# Patient Record
(demographics unavailable — no encounter records)

---

## 2025-05-05 NOTE — PHYSICAL EXAM
[NI] : Normal [de-identified] : l lower eyelid skin with red biopsy site  along the lateral central 1/3 of the eyelid near the tear trough  [de-identified] : lesion as above l lower eylid

## 2025-05-05 NOTE — ASSESSMENT
[FreeTextEntry1] : ALEXANDER desires to have excision and repair done with frozen section at the hospital rather than going to a moh's surgeon and then having reconstruction elsewhere The risks, benefits, alternatives, limitations and the permanent scars were outlined with the patient. Discussion of risks included but not limited to bleeding, infection, delayed healing, and anesthetic risk. All of ALEXANDER 's questions and concerns were addressed and answered completely  The instructions were reviewed in detail with ALEXANDER.

## 2025-05-05 NOTE — HISTORY OF PRESENT ILLNESS
[FreeTextEntry1] : ALEXANDER is 75 y/o wf c/o new bcca l lower eyelid after biopsy of new lesion by dermatologist  ALEXANDER was referred for Mohs but desires to have plastic surgeon excise and repair defect in the hospital The risks, benefits, alternatives, limitations and the permanent scars were outlined with the patient. Discussion of risks included but not limited to bleeding, infection, delayed healing, and anesthetic risk. All of ALEXANDER 's questions and concerns were addressed and answered completely  The instructions were reviewed in detail with ALEXANDER.

## 2025-07-01 NOTE — ASSESSMENT
[FreeTextEntry1] : excellent post operative result and pt is happy with appearance The instructions were reviewed in detail with ALEXANDER.  All of ALEXANDER 's questions and concerns were addressed and answered completely ALEXANDER will return to the office for a post procedure visit 5 weeks for check.  ALEXANDER instructed to limit activity next 3 weeks

## 2025-07-01 NOTE — HISTORY OF PRESENT ILLNESS
[FreeTextEntry1] : ALEXANDER is doing well after excision bcca l lower eyelid with rotation advancement flap reconstruction The patient denies fever, chills, shortness of breath, chest pain, calf pain ALEXANDER  has had uncomplicated recovery from surgery and anesthesia All of ALEXANDER's incisions are clean dry and healing well.  Her  sutures were removed and areas steri stripped. flap is healthy with no delayed healing

## 2025-07-29 NOTE — HISTORY OF PRESENT ILLNESS
[FreeTextEntry1] : well healed scar ALEXANDER is.pt happy with appearance The instructions were reviewed in detail with ALEXANDER.

## 2025-07-29 NOTE — ASSESSMENT
[FreeTextEntry1] : ALEXANDER is well healed fo0rm reconstruction l lower eyelid cheek.  she is now interested in botox and fillers The risks, benefits, alternatives, limitations and the permanent scars were outlined with the patient. Discussion of risks included but not limited to bleeding, infection, delayed healing, and anesthetic risk. All of ALEXANDER 's questions and concerns were addressed and answered completely The instructions were reviewed in detail with ALEXANDER.  ALEXANDER will return to the office for a procedure visit